# Patient Record
Sex: FEMALE | Race: BLACK OR AFRICAN AMERICAN | Employment: OTHER | URBAN - METROPOLITAN AREA
[De-identification: names, ages, dates, MRNs, and addresses within clinical notes are randomized per-mention and may not be internally consistent; named-entity substitution may affect disease eponyms.]

---

## 2022-10-02 ENCOUNTER — HOSPITAL ENCOUNTER (EMERGENCY)
Dept: ULTRASOUND IMAGING | Age: 37
Discharge: HOME | End: 2022-10-05
Payer: MEDICAID

## 2022-10-02 ENCOUNTER — HOSPITAL ENCOUNTER (EMERGENCY)
Age: 37
Discharge: HOME | End: 2022-10-02
Attending: EMERGENCY MEDICINE
Payer: MEDICAID

## 2022-10-02 VITALS
OXYGEN SATURATION: 100 % | TEMPERATURE: 98.8 F | HEART RATE: 81 BPM | SYSTOLIC BLOOD PRESSURE: 130 MMHG | BODY MASS INDEX: 22.93 KG/M2 | HEIGHT: 62 IN | WEIGHT: 124.6 LBS | DIASTOLIC BLOOD PRESSURE: 70 MMHG | RESPIRATION RATE: 18 BRPM

## 2022-10-02 DIAGNOSIS — R10.9 ACUTE ABDOMINAL PAIN: Primary | ICD-10-CM

## 2022-10-02 DIAGNOSIS — R10.32 ABDOMINAL PAIN, LEFT LOWER QUADRANT: ICD-10-CM

## 2022-10-02 LAB
ALBUMIN SERPL-MCNC: 3.5 G/DL (ref 3.5–5)
ALBUMIN/GLOB SERPL: 0.9 {RATIO} (ref 1.2–3.5)
ALP SERPL-CCNC: 54 U/L (ref 50–136)
ALT SERPL-CCNC: 23 U/L (ref 12–65)
ANION GAP SERPL CALC-SCNC: 4 MMOL/L (ref 4–13)
APPEARANCE UR: ABNORMAL
AST SERPL-CCNC: 29 U/L (ref 15–37)
BACTERIA URNS QL MICRO: NEGATIVE /HPF
BASOPHILS # BLD: 0 K/UL (ref 0–0.2)
BASOPHILS NFR BLD: 0 % (ref 0–2)
BILIRUB SERPL-MCNC: 0.6 MG/DL (ref 0.2–1.1)
BILIRUB UR QL: NEGATIVE
BUN SERPL-MCNC: 21 MG/DL (ref 6–23)
CALCIUM SERPL-MCNC: 9.1 MG/DL (ref 8.3–10.4)
CASTS URNS QL MICRO: ABNORMAL /LPF
CHLORIDE SERPL-SCNC: 108 MMOL/L (ref 101–110)
CO2 SERPL-SCNC: 27 MMOL/L (ref 21–32)
COLOR UR: ABNORMAL
CREAT SERPL-MCNC: 1.03 MG/DL (ref 0.6–1)
DIFFERENTIAL METHOD BLD: NORMAL
EOSINOPHIL # BLD: 0.1 K/UL (ref 0–0.8)
EOSINOPHIL NFR BLD: 2 % (ref 0.5–7.8)
EPI CELLS #/AREA URNS HPF: ABNORMAL /HPF
ERYTHROCYTE [DISTWIDTH] IN BLOOD BY AUTOMATED COUNT: 13.1 % (ref 11.9–14.6)
GLOBULIN SER CALC-MCNC: 4 G/DL (ref 2.3–3.5)
GLUCOSE SERPL-MCNC: 104 MG/DL (ref 65–100)
GLUCOSE UR STRIP.AUTO-MCNC: NEGATIVE MG/DL
HCG UR QL: NEGATIVE
HCT VFR BLD AUTO: 39.7 % (ref 35.8–46.3)
HGB BLD-MCNC: 13 G/DL (ref 11.7–15.4)
HGB UR QL STRIP: ABNORMAL
IMM GRANULOCYTES # BLD AUTO: 0 K/UL (ref 0–0.5)
IMM GRANULOCYTES NFR BLD AUTO: 0 % (ref 0–5)
KETONES UR QL STRIP.AUTO: ABNORMAL MG/DL
LEUKOCYTE ESTERASE UR QL STRIP.AUTO: ABNORMAL
LIPASE SERPL-CCNC: 118 U/L (ref 73–393)
LYMPHOCYTES # BLD: 2.2 K/UL (ref 0.5–4.6)
LYMPHOCYTES NFR BLD: 34 % (ref 13–44)
MCH RBC QN AUTO: 27.9 PG (ref 26.1–32.9)
MCHC RBC AUTO-ENTMCNC: 32.7 G/DL (ref 31.4–35)
MCV RBC AUTO: 85.2 FL (ref 79.6–97.8)
MONOCYTES # BLD: 0.7 K/UL (ref 0.1–1.3)
MONOCYTES NFR BLD: 10 % (ref 4–12)
NEUTS SEG # BLD: 3.4 K/UL (ref 1.7–8.2)
NEUTS SEG NFR BLD: 54 % (ref 43–78)
NITRITE UR QL STRIP.AUTO: NEGATIVE
NRBC # BLD: 0 K/UL (ref 0–0.2)
PH UR STRIP: 7.5 [PH] (ref 5–9)
PLATELET # BLD AUTO: 210 K/UL (ref 150–450)
PMV BLD AUTO: 11 FL (ref 9.4–12.3)
POTASSIUM SERPL-SCNC: 4.3 MMOL/L (ref 3.5–5.1)
PROT SERPL-MCNC: 7.5 G/DL (ref 6.3–8.2)
PROT UR STRIP-MCNC: NEGATIVE MG/DL
RBC # BLD AUTO: 4.66 M/UL (ref 4.05–5.2)
RBC #/AREA URNS HPF: ABNORMAL /HPF
SODIUM SERPL-SCNC: 139 MMOL/L (ref 136–145)
SP GR UR REFRACTOMETRY: 1.02 (ref 1–1.02)
UROBILINOGEN UR QL STRIP.AUTO: 1 EU/DL (ref 0.2–1)
WBC # BLD AUTO: 6.4 K/UL (ref 4.3–11.1)
WBC URNS QL MICRO: ABNORMAL /HPF

## 2022-10-02 PROCEDURE — 83690 ASSAY OF LIPASE: CPT

## 2022-10-02 PROCEDURE — 81025 URINE PREGNANCY TEST: CPT

## 2022-10-02 PROCEDURE — 81001 URINALYSIS AUTO W/SCOPE: CPT

## 2022-10-02 PROCEDURE — 76830 TRANSVAGINAL US NON-OB: CPT

## 2022-10-02 PROCEDURE — 6360000002 HC RX W HCPCS: Performed by: EMERGENCY MEDICINE

## 2022-10-02 PROCEDURE — 85025 COMPLETE CBC W/AUTO DIFF WBC: CPT

## 2022-10-02 PROCEDURE — 80053 COMPREHEN METABOLIC PANEL: CPT

## 2022-10-02 PROCEDURE — 96374 THER/PROPH/DIAG INJ IV PUSH: CPT

## 2022-10-02 PROCEDURE — 99284 EMERGENCY DEPT VISIT MOD MDM: CPT

## 2022-10-02 RX ORDER — NAPROXEN SODIUM 550 MG/1
550 TABLET ORAL 2 TIMES DAILY WITH MEALS
Qty: 8 TABLET | Refills: 0 | Status: SHIPPED | OUTPATIENT
Start: 2022-10-02 | End: 2022-10-06

## 2022-10-02 RX ORDER — KETOROLAC TROMETHAMINE 15 MG/ML
15 INJECTION, SOLUTION INTRAMUSCULAR; INTRAVENOUS
Status: COMPLETED | OUTPATIENT
Start: 2022-10-02 | End: 2022-10-02

## 2022-10-02 RX ADMIN — KETOROLAC TROMETHAMINE 15 MG: 15 INJECTION, SOLUTION INTRAMUSCULAR; INTRAVENOUS at 20:55

## 2022-10-02 ASSESSMENT — PAIN - FUNCTIONAL ASSESSMENT: PAIN_FUNCTIONAL_ASSESSMENT: 0-10

## 2022-10-02 ASSESSMENT — ENCOUNTER SYMPTOMS
COLOR CHANGE: 0
BACK PAIN: 0
DIARRHEA: 0
VOMITING: 0
ABDOMINAL PAIN: 1

## 2022-10-02 ASSESSMENT — PAIN DESCRIPTION - LOCATION
LOCATION: ABDOMEN
LOCATION: ABDOMEN

## 2022-10-02 ASSESSMENT — PAIN SCALES - GENERAL
PAINLEVEL_OUTOF10: 4
PAINLEVEL_OUTOF10: 3
PAINLEVEL_OUTOF10: 10

## 2022-10-02 ASSESSMENT — PAIN DESCRIPTION - ORIENTATION
ORIENTATION: LEFT
ORIENTATION: LEFT

## 2022-10-02 ASSESSMENT — PAIN DESCRIPTION - DESCRIPTORS
DESCRIPTORS: STABBING;SHARP
DESCRIPTORS: SHARP

## 2022-10-02 ASSESSMENT — PAIN DESCRIPTION - PAIN TYPE: TYPE: ACUTE PAIN

## 2022-10-02 NOTE — Clinical Note
Devon Loyola was seen and treated in our emergency department on 10/2/2022. She may return to work on 10/04/2022. If you have any questions or concerns, please don't hesitate to call.       Marquis Pedro MD

## 2022-10-02 NOTE — ED TRIAGE NOTES
Lower left abdominal pain for 2 month / today the pain began radiating down leg today/ pt states she started her period earlier / denies n/v denies diarrhea/ pt has attempted otc for comfort

## 2022-10-02 NOTE — Clinical Note
Rani Toure was seen and treated in our emergency department on 10/2/2022. She may return to work on 10/04/2022. If you have any questions or concerns, please don't hesitate to call.       Nima Carranza MD

## 2022-10-03 NOTE — ED NOTES
Pt was made aware that we need a urine sample. Pt states she is unable to provide a sample at this time but will advise when ready.       Juan Daniel Sanders RN  10/02/22 2002

## 2022-10-03 NOTE — ED NOTES
I have reviewed discharge instructions with the patient. The patient verbalized understanding. Patient left ED via Discharge Method: ambulatory to Home with . Opportunity for questions and clarification provided. Patient given 1 scripts. To continue your aftercare when you leave the hospital, you may receive an automated call from our care team to check in on how you are doing. This is a free service and part of our promise to provide the best care and service to meet your aftercare needs.  If you have questions, or wish to unsubscribe from this service please call [phone]. Thank you for Choosing our [facility] Emergency Department.         Geronimo Ortega RN  10/02/22 [id number]

## 2022-10-03 NOTE — DISCHARGE INSTRUCTIONS
There is no obvious cause for your pain. I doubt infection or immediate surgical issues. Number for OB/GYN doctor given. Consider calling to set up appointment with him for further evaluation. Recheck for persisting pain, fever or vomiting. CAT scan may need to be done.

## 2022-10-03 NOTE — ED PROVIDER NOTES
Emergency Department Provider Note                   PCP:                No primary care provider on file. Age: 44 y.o. Sex: female     No diagnosis found. DISPOSITION          MDM  Number of Diagnoses or Management Options  Diagnosis management comments: Left lower quadrant pain. Screening lab work. Rule out pregnancy. Possibility ovarian cyst or torsion. Amount and/or Complexity of Data Reviewed  Clinical lab tests: reviewed and ordered  Tests in the radiology section of CPT®: ordered and reviewed    Risk of Complications, Morbidity, and/or Mortality  Presenting problems: moderate  Diagnostic procedures: low  Management options: low    Patient Progress  Patient progress: stable             Orders Placed This Encounter   Procedures    US PELVIS LIMITED    CBC with Diff    Lipase    Comprehensive Metabolic Panel    Diet NPO    POCT Urine Dipstick    POC PREGNANCY UR-QUAL    Saline lock IV        Medications   ketorolac (TORADOL) injection 15 mg (has no administration in time range)       New Prescriptions    No medications on file        Eulice Payment is a 44 y.o. female who presents to the Emergency Department with chief complaint of    Chief Complaint   Patient presents with    Abdominal Pain      27-year-old female with 3-hour history of left lower quadrant pain. Sudden onset. Going somewhat to the thigh and to leg. Sharp and twisting. Patient had somewhat of a similar episode 2 months ago. Had another episode about 6 weeks later. Those were not associated with her menses. She does have some vaginal bleeding this evening that is mild. Somewhat early for her menses. She is not on any hormones. No IUD. Did try to urinate and has seen increase the pain. No nausea or vomiting. No fever chills. No chest pain shortness of breath. Patient's had 3 C-sections in the past.  Tubal ligation. This episodes went away. This is not.     The history is provided by the patient. Abdominal Pain  Pain location:  LLQ  Pain quality: shooting and stabbing    Pain radiates to:  L leg  Pain severity:  Moderate  Onset quality:  Sudden  Duration:  3 hours  Timing:  Constant  Progression:  Worsening  Associated symptoms: vaginal bleeding    Associated symptoms: no chest pain, no chills, no diarrhea, no dysuria, no fever, no vaginal discharge and no vomiting        Review of Systems   Constitutional:  Negative for chills and fever. Cardiovascular:  Negative for chest pain. Gastrointestinal:  Positive for abdominal pain. Negative for diarrhea and vomiting. Genitourinary:  Positive for vaginal bleeding. Negative for difficulty urinating, dysuria, flank pain and vaginal discharge. Musculoskeletal:  Negative for back pain. Skin:  Negative for color change and rash. No past medical history on file. No past surgical history on file. No family history on file. Social History     Socioeconomic History    Marital status: Single         Hydrocodone, Hydrocodone-acetaminophen, and Shellfish allergy     Previous Medications    No medications on file        Vitals signs and nursing note reviewed. Patient Vitals for the past 4 hrs:   Temp Pulse Resp BP SpO2   10/02/22 1940 98.9 °F (37.2 °C) 87 15 130/69 100 %          Physical Exam  Vitals and nursing note reviewed. Constitutional:       Appearance: She is not ill-appearing. HENT:      Head: Normocephalic and atraumatic. Right Ear: External ear normal.      Left Ear: External ear normal.      Mouth/Throat:      Pharynx: Oropharynx is clear. Eyes:      General: No scleral icterus. Extraocular Movements: Extraocular movements intact. Pupils: Pupils are equal, round, and reactive to light. Cardiovascular:      Rate and Rhythm: Normal rate and regular rhythm. Pulmonary:      Effort: Pulmonary effort is normal.      Breath sounds: Normal breath sounds. Abdominal:      Palpations: Abdomen is soft. Tenderness: There is abdominal tenderness in the left lower quadrant. There is no guarding or rebound. Negative signs include Mcdonough's sign and McBurney's sign. Comments: Tender suprapubically to the left. No mass rebound or guarding   Musculoskeletal:         General: No swelling or tenderness. Right lower leg: No edema. Left lower leg: No edema. Skin:     General: Skin is warm and dry. Neurological:      General: No focal deficit present. Mental Status: She is alert.         Procedures    Results for orders placed or performed during the hospital encounter of 10/02/22   CBC with Diff   Result Value Ref Range    WBC 6.4 4.3 - 11.1 K/uL    RBC 4.66 4.05 - 5.2 M/uL    Hemoglobin 13.0 11.7 - 15.4 g/dL    Hematocrit 39.7 35.8 - 46.3 %    MCV 85.2 79.6 - 97.8 FL    MCH 27.9 26.1 - 32.9 PG    MCHC 32.7 31.4 - 35.0 g/dL    RDW 13.1 11.9 - 14.6 %    Platelets 435 468 - 975 K/uL    MPV 11.0 9.4 - 12.3 FL    nRBC 0.00 0.0 - 0.2 K/uL    Differential Type AUTOMATED      Seg Neutrophils 54 43 - 78 %    Lymphocytes 34 13 - 44 %    Monocytes 10 4.0 - 12.0 %    Eosinophils % 2 0.5 - 7.8 %    Basophils 0 0.0 - 2.0 %    Immature Granulocytes 0 0.0 - 5.0 %    Segs Absolute 3.4 1.7 - 8.2 K/UL    Absolute Lymph # 2.2 0.5 - 4.6 K/UL    Absolute Mono # 0.7 0.1 - 1.3 K/UL    Absolute Eos # 0.1 0.0 - 0.8 K/UL    Basophils Absolute 0.0 0.0 - 0.2 K/UL    Absolute Immature Granulocyte 0.0 0.0 - 0.5 K/UL   Lipase   Result Value Ref Range    Lipase 118 73 - 393 U/L   Comprehensive Metabolic Panel   Result Value Ref Range    Sodium 139 136 - 145 mmol/L    Potassium 4.3 3.5 - 5.1 mmol/L    Chloride 108 101 - 110 mmol/L    CO2 27 21 - 32 mmol/L    Anion Gap 4 4 - 13 mmol/L    Glucose 104 (H) 65 - 100 mg/dL    BUN 21 6 - 23 MG/DL    Creatinine 1.03 (H) 0.6 - 1.0 MG/DL    GFR African American >60 >60 ml/min/1.73m2    GFR Non- >60 >60 ml/min/1.73m2    Calcium 9.1 8.3 - 10.4 MG/DL    Total Bilirubin 0.6 0.2 - 1.1 MG/DL    ALT 23 12 - 65 U/L    AST 29 15 - 37 U/L    Alk Phosphatase 54 50 - 136 U/L    Total Protein 7.5 6.3 - 8.2 g/dL    Albumin 3.5 3.5 - 5.0 g/dL    Globulin 4.0 (H) 2.3 - 3.5 g/dL    Albumin/Globulin Ratio 0.9 (L) 1.2 - 3.5          US PELVIS LIMITED    (Results Pending)          Results Include:    Recent Results (from the past 24 hour(s))   CBC with Diff    Collection Time: 10/02/22  7:47 PM   Result Value Ref Range    WBC 6.4 4.3 - 11.1 K/uL    RBC 4.66 4.05 - 5.2 M/uL    Hemoglobin 13.0 11.7 - 15.4 g/dL    Hematocrit 39.7 35.8 - 46.3 %    MCV 85.2 79.6 - 97.8 FL    MCH 27.9 26.1 - 32.9 PG    MCHC 32.7 31.4 - 35.0 g/dL    RDW 13.1 11.9 - 14.6 %    Platelets 863 048 - 349 K/uL    MPV 11.0 9.4 - 12.3 FL    nRBC 0.00 0.0 - 0.2 K/uL    Differential Type AUTOMATED      Seg Neutrophils 54 43 - 78 %    Lymphocytes 34 13 - 44 %    Monocytes 10 4.0 - 12.0 %    Eosinophils % 2 0.5 - 7.8 %    Basophils 0 0.0 - 2.0 %    Immature Granulocytes 0 0.0 - 5.0 %    Segs Absolute 3.4 1.7 - 8.2 K/UL    Absolute Lymph # 2.2 0.5 - 4.6 K/UL    Absolute Mono # 0.7 0.1 - 1.3 K/UL    Absolute Eos # 0.1 0.0 - 0.8 K/UL    Basophils Absolute 0.0 0.0 - 0.2 K/UL    Absolute Immature Granulocyte 0.0 0.0 - 0.5 K/UL   Lipase    Collection Time: 10/02/22  8:17 PM   Result Value Ref Range    Lipase 118 73 - 393 U/L   Comprehensive Metabolic Panel    Collection Time: 10/02/22  8:17 PM   Result Value Ref Range    Sodium 139 136 - 145 mmol/L    Potassium 4.3 3.5 - 5.1 mmol/L    Chloride 108 101 - 110 mmol/L    CO2 27 21 - 32 mmol/L    Anion Gap 4 4 - 13 mmol/L    Glucose 104 (H) 65 - 100 mg/dL    BUN 21 6 - 23 MG/DL    Creatinine 1.03 (H) 0.6 - 1.0 MG/DL    GFR African American >60 >60 ml/min/1.73m2    GFR Non- >60 >60 ml/min/1.73m2    Calcium 9.1 8.3 - 10.4 MG/DL    Total Bilirubin 0.6 0.2 - 1.1 MG/DL    ALT 23 12 - 65 U/L    AST 29 15 - 37 U/L    Alk Phosphatase 54 50 - 136 U/L    Total Protein 7.5 6.3 - 8.2 g/dL    Albumin 3.5 3.5 - 5.0 g/dL    Globulin 4.0 (H) 2.3 - 3.5 g/dL    Albumin/Globulin Ratio 0.9 (L) 1.2 - 3.5     POC Pregnancy Urine Qual    Collection Time: 10/02/22  9:07 PM   Result Value Ref Range    Preg Test, Ur Negative NEG     Urinalysis w rflx microscopic    Collection Time: 10/02/22  9:08 PM   Result Value Ref Range    Color, UA YELLOW/STRAW      Appearance TURBID      Specific Gravity, UA 1.022 1.001 - 1.023      pH, Urine 7.5 5.0 - 9.0      Protein, UA Negative NEG mg/dL    Glucose, UA Negative mg/dL    Ketones, Urine TRACE (A) NEG mg/dL    Bilirubin Urine Negative NEG      Blood, Urine LARGE (A) NEG      Urobilinogen, Urine 1.0 0.2 - 1.0 EU/dL    Nitrite, Urine Negative NEG      Leukocyte Esterase, Urine TRACE (A) NEG      WBC, UA 5-10 0 /hpf    RBC, UA 20-50 0 /hpf    Epithelial Cells UA 0-5 0 /hpf    BACTERIA, URINE Negative 0 /hpf    Casts 0-2 0 /lpf     US PELVIS COMPLETE    Result Date: 10/2/2022  EXAM: US PELVIS COMPLETE HISTORY: Left lower quadrant pain, concern for ovarian cyst, torsion or uterine fibroids. . TECHNIQUE: Grayscale sonographic evaluation of the pelvis supplemented with color and spectral Doppler as needed. Transabdominal and transvaginal images were obtained. COMPARISON: 8/25/2010 FINDINGS: The uterus is anteverted and measures 6.9 x 4.6 x 7.1 cm. Myometrium is homogeneous other than nabothian cysts. Endometrium measures 6.4 mm. Right ovary measures 2.4 x 1.0 x 1.9 cm. Left ovary measures 3.7 x 1.7 x 2.7 cm. Both ovaries demonstrate normal follicular pattern with intact arterial and venous flow signal. No free fluid within the visualized pelvis or adnexa. Unremarkable examination of the pelvis. No evidence of ovarian torsion on either side. Symptomatic treatment. We will have patient follow-up with OB/GYN doctor. On menses. Some blood in the urine. More likely due to menses. Think kidney stone is less likely but still possible. Patient states pain essentially gone. Voice dictation software was used during the making of this note. This software is not perfect and grammatical and other typographical errors may be present. This note has not been completely proofread for errors.      Alf Singh MD  10/02/22 0584       Alf Singh MD  10/02/22 [id number]       Alf Singh MD  10/02/22 0341

## 2024-01-15 ENCOUNTER — HOSPITAL ENCOUNTER (EMERGENCY)
Age: 39
Discharge: HOME | End: 2024-01-15

## 2024-01-15 VITALS
TEMPERATURE: 100.9 F | OXYGEN SATURATION: 98 % | SYSTOLIC BLOOD PRESSURE: 115 MMHG | RESPIRATION RATE: 18 BRPM | DIASTOLIC BLOOD PRESSURE: 71 MMHG | HEART RATE: 109 BPM

## 2024-01-15 DIAGNOSIS — J02.9 ACUTE PHARYNGITIS, UNSPECIFIED ETIOLOGY: Primary | ICD-10-CM

## 2024-01-15 PROCEDURE — 6370000000 HC RX 637 (ALT 250 FOR IP)

## 2024-01-15 PROCEDURE — 99283 EMERGENCY DEPT VISIT LOW MDM: CPT

## 2024-01-15 RX ORDER — IBUPROFEN 600 MG/1
600 TABLET ORAL
Status: COMPLETED | OUTPATIENT
Start: 2024-01-15 | End: 2024-01-15

## 2024-01-15 RX ORDER — PENICILLIN V POTASSIUM 500 MG/1
500 TABLET ORAL 4 TIMES DAILY
Qty: 40 TABLET | Refills: 0 | Status: SHIPPED | OUTPATIENT
Start: 2024-01-15 | End: 2024-01-25

## 2024-01-15 RX ORDER — DEXAMETHASONE 6 MG/1
6 TABLET ORAL DAILY
Qty: 4 TABLET | Refills: 0 | Status: SHIPPED | OUTPATIENT
Start: 2024-01-15 | End: 2024-01-19

## 2024-01-15 RX ORDER — LIDOCAINE HYDROCHLORIDE 20 MG/ML
5 SOLUTION OROPHARYNGEAL PRN
Qty: 60 ML | Refills: 0 | Status: SHIPPED | OUTPATIENT
Start: 2024-01-15 | End: 2024-01-19

## 2024-01-15 RX ADMIN — IBUPROFEN 600 MG: 600 TABLET, FILM COATED ORAL at 18:06

## 2024-01-15 ASSESSMENT — PAIN DESCRIPTION - LOCATION: LOCATION: THROAT

## 2024-01-15 ASSESSMENT — LIFESTYLE VARIABLES
HOW OFTEN DO YOU HAVE A DRINK CONTAINING ALCOHOL: NEVER
HOW MANY STANDARD DRINKS CONTAINING ALCOHOL DO YOU HAVE ON A TYPICAL DAY: PATIENT DOES NOT DRINK
HOW OFTEN DO YOU HAVE A DRINK CONTAINING ALCOHOL: NEVER

## 2024-01-15 ASSESSMENT — PAIN SCALES - GENERAL: PAINLEVEL_OUTOF10: 10

## 2024-01-15 ASSESSMENT — PAIN DESCRIPTION - PAIN TYPE: TYPE: ACUTE PAIN

## 2024-01-15 ASSESSMENT — PAIN - FUNCTIONAL ASSESSMENT: PAIN_FUNCTIONAL_ASSESSMENT: 0-10

## 2024-01-15 NOTE — DISCHARGE INSTRUCTIONS
You were evaluated in the emergency department today for sore throat fevers and bodyaches    You have a known strep exposure    Your physical exam is consistent with strep pharyngitis.    I have written you a prescription for antibiotic called penicillin to begin today  I have written you prescription for steroid that I recommend you start tomorrow in the morning.  Take this with food    I have written you prescription for numbing solution to gargle and then spit.  If you accidentally swallowed it this is okay    Cannot go to work until you have been on antibiotics for over 24 hours    Contact your primary care provider to schedule follow-up within the next week or 2    Return to the emergency department if you have been on antibiotics for over 72 hours and you have swelling in your tongue, swelling under your chin, inability to handle your secretions as we discussed.

## 2024-01-15 NOTE — ED PROVIDER NOTES
Emergency Department Provider Note       PCP: No primary care provider on file.   Age: 38 y.o.   Sex: female     DISPOSITION Decision To Discharge 01/15/2024 05:49:19 PM       ICD-10-CM    1. Acute pharyngitis, unspecified etiology  J02.9 penicillin v potassium (VEETID) 500 MG tablet     dexAMETHasone (DECADRON) 6 MG tablet     lidocaine viscous hcl (XYLOCAINE) 2 % SOLN solution    Presumed streptococcal based on history and physical          Medical Decision Making     Complexity of Problems Addressed:  Complexity of Problem: 1 acute, uncomplicated illness or injury.    Data Reviewed and Analyzed:  I independently ordered and reviewed each unique test.  I reviewed external records: provider visit note from PCP.   Reviewed notes from primary care visit 11/17/2022          Discussion of management or test interpretation.  Vital signs reviewed, patient stable, NAD, , nontoxic in appearance   Febrile with a temperature of 100.9.  Heart rate is 109 consistent with fever    Overall well-appearing 38-year-old female presents emergency department with 3 days of sore throat, body aches, fevers.  Known strep exposure from her child.  Denies cough or respiratory symptoms.    Physical exam today is consistent with strep pharyngitis.  Patient has erythema of the posterior oropharynx, tonsillar exudate and edema.  She has palpable cervical adenopathy.  No cough.  Uvula is midline and patient is handling her secretions.    Will go ahead and treat for strep pharyngitis due to known exposure and her physical exam.  No indication for swabs.  Patient is in agreement with this plan.    Will administer Motrin here in the emergency department for her fever       No emergent findings today.  No indication for blood work or imaging.  Patient is stable and can be discharged home with follow-up to primary care.  Patient will be discharged with penicillin, viscous lidocaine and steroid.    Strict return to ED precautions reiterated